# Patient Record
Sex: MALE | Race: WHITE | Employment: STUDENT | URBAN - METROPOLITAN AREA
[De-identification: names, ages, dates, MRNs, and addresses within clinical notes are randomized per-mention and may not be internally consistent; named-entity substitution may affect disease eponyms.]

---

## 2017-03-20 ENCOUNTER — OFFICE VISIT (OUTPATIENT)
Dept: FAMILY MEDICINE CLINIC | Facility: CLINIC | Age: 22
End: 2017-03-20

## 2017-03-20 VITALS
BODY MASS INDEX: 34.33 KG/M2 | HEIGHT: 68.5 IN | TEMPERATURE: 98 F | DIASTOLIC BLOOD PRESSURE: 74 MMHG | SYSTOLIC BLOOD PRESSURE: 110 MMHG | WEIGHT: 229.13 LBS | RESPIRATION RATE: 20 BRPM | OXYGEN SATURATION: 98 % | HEART RATE: 83 BPM

## 2017-03-20 DIAGNOSIS — Z00.129 ENCOUNTER FOR ROUTINE CHILD HEALTH EXAMINATION WITHOUT ABNORMAL FINDINGS: Primary | ICD-10-CM

## 2017-03-20 DIAGNOSIS — Z91.030 HISTORY OF BEE STING ALLERGY: ICD-10-CM

## 2017-03-20 PROCEDURE — 99395 PREV VISIT EST AGE 18-39: CPT | Performed by: FAMILY MEDICINE

## 2017-03-20 RX ORDER — EPINEPHRINE 0.3 MG/.3ML
0.3 INJECTION SUBCUTANEOUS ONCE
Qty: 2 EACH | Refills: 2 | Status: SHIPPED | OUTPATIENT
Start: 2017-03-20 | End: 2017-03-20

## 2017-03-20 RX ORDER — EPINEPHRINE 0.3 MG/.3ML
0.3 INJECTION SUBCUTANEOUS ONCE
Qty: 2 EACH | Refills: 0 | Status: SHIPPED | OUTPATIENT
Start: 2017-03-20 | End: 2017-03-20

## 2017-03-20 NOTE — PROGRESS NOTES
Christa Jeans is a 25year old male who presents for a complete physical exam.   HPI:   Pt complains of nothing at this time.  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 Imaging & Consults:  None  .

## 2017-03-20 NOTE — TELEPHONE ENCOUNTER
Ninfa Ortiz Nurse        Caller: Pt (Today, 2:44 PM)                     Pt would like to have \"epi-pen\" sent to [facility] [address] [place] [address] in [address] [place] [address]. Call pt if ?  [phone]         Was sent to [facility] at [facility].  Needs resent to [facility]

## 2017-03-21 ENCOUNTER — TELEPHONE (OUTPATIENT)
Dept: FAMILY MEDICINE CLINIC | Facility: CLINIC | Age: 22
End: 2017-03-21

## 2017-03-21 NOTE — TELEPHONE ENCOUNTER
Aron Tejada calling to advise that Auv Qi still off the market. Can patient get the new generic auto injector? \"Epinephrine auto injector\"  Comes in 2-pack. Otherwise patient would have to pay $500-$600 for EpiPen. Per v/o Dr. Chuck Douglas, [facility] to substitute.

## 2017-12-20 ENCOUNTER — TELEPHONE (OUTPATIENT)
Dept: FAMILY MEDICINE CLINIC | Facility: CLINIC | Age: 22
End: 2017-12-20

## 2017-12-20 DIAGNOSIS — Z82.0 FAMILY HISTORY OF CHARCOT-MARIE-TOOTH DISEASE: Primary | ICD-10-CM

## 2017-12-20 NOTE — TELEPHONE ENCOUNTER
Dr Zayra Lan wanted the patient to have the Charc[place] Mary Tooth blood test done. Mom states that she needs a referral done for this thru the insurance company. Please call mom when referral is done so that she can have the test done.

## 2023-03-13 ENCOUNTER — PATIENT OUTREACH (OUTPATIENT)
Dept: CASE MANAGEMENT | Age: 28
End: 2023-03-13

## 2023-03-13 NOTE — PROCEDURES
The office order for PCP removal request is Approved and finalized on March 13, 2023.     Thanks,  [facility] [employer]

## (undated) NOTE — MR AVS SNAPSHOT
[address] [facility] [address]  [phone]               Thank you for choosing us for your health care visit with Karmen Tidwell DO.   We are glad to serve you and happy to provide you with this sum medications prescribed for you. Read the directions carefully, and ask your doctor or other care provider to review them with you.          Where to Get Your Medications      These medications were sent to [facility] [address]